# Patient Record
Sex: MALE | Race: WHITE | NOT HISPANIC OR LATINO | Employment: STUDENT | URBAN - METROPOLITAN AREA
[De-identification: names, ages, dates, MRNs, and addresses within clinical notes are randomized per-mention and may not be internally consistent; named-entity substitution may affect disease eponyms.]

---
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
## 2024-09-22 ENCOUNTER — HOSPITAL ENCOUNTER (EMERGENCY)
Age: 19
Discharge: HOME | End: 2024-09-22
Payer: COMMERCIAL

## 2024-09-22 VITALS
OXYGEN SATURATION: 97 % | SYSTOLIC BLOOD PRESSURE: 143 MMHG | HEART RATE: 71 BPM | DIASTOLIC BLOOD PRESSURE: 89 MMHG | RESPIRATION RATE: 17 BRPM | TEMPERATURE: 97.52 F

## 2024-09-22 VITALS
RESPIRATION RATE: 16 BRPM | TEMPERATURE: 98.06 F | DIASTOLIC BLOOD PRESSURE: 76 MMHG | SYSTOLIC BLOOD PRESSURE: 122 MMHG | HEART RATE: 88 BPM | OXYGEN SATURATION: 100 %

## 2024-09-22 DIAGNOSIS — R11.2: Primary | ICD-10-CM

## 2024-09-22 DIAGNOSIS — E86.0: ICD-10-CM

## 2024-09-22 LAB
ADD MANUAL DIFF: NO
ADD URINE MICROSCOPIC?: YES
ALANINE AMINOTRANSFERASE: 37 U/L (ref 6–50)
ALBUMIN LEVEL: 5.4 G/DL (ref 3.5–5.1)
ALBUMIN SPEC-MCNC: 5.4 G/DL (ref 3.5–5.1)
ALKALINE PHOSPHATASE: 44 U/L (ref 38–126)
ALP SERPL-CCNC: 44 U/L (ref 38–126)
ALT SERPL-CCNC: 37 U/L (ref 6–50)
ANION GAP: 15 MMOL/L (ref 4–12)
ASPARTATE AMINO TRANSFERASE: 36 U/L (ref 17–59)
AST SERPL-CCNC: 36 U/L (ref 17–59)
BASIC METABOLIC AND HEMATOCRIT PNL BLD: 42.8 % (ref 42–52)
BILIRUBIN,TOTAL: 0.8 MG/DL (ref 0.2–1.3)
BLOOD UREA NITROGEN: 7 MG/DL (ref 9–20)
BUN SERPL-MCNC: 7 MG/DL (ref 9–20)
CALCIUM: 10.4 MG/DL (ref 8.4–10.2)
CARBON DIOXIDE: 24 MMOL/L (ref 22–30)
CASTS #/AREA URNS HPF: (no result) /[HPF]
CHLORIDE SPEC-MCNC: 102 MMOL/L (ref 98–107)
CHLORIDE: 102 MMOL/L (ref 98–107)
CREAT CL PREDICTED SERPL C-G-VRATE: 153 ML/MIN
ESTIMATED CRCL CALCULATION: 153 ML/MIN
ESTIMATED GLOMERULAR FILT RATE: > 60
GFRSERPLBLD MDRD-ARVRAT: > 60 ML/MIN/{1.73_M2}
GLUCOSE SERPL-MCNC: 136 MG/DL (ref 65–110)
GLUCOSE: 136 MG/DL (ref 65–110)
HEMATOCRIT: 42.8 % (ref 42–52)
HEMOGLOBIN: 14.8 G/DL (ref 14–18)
HGB+HCT PNL BLD: 42.8 % (ref 42–52)
IMM GRANULOCYTES NFR BLD AUTO: 0.3 % (ref 0–0.5)
IPF CHARGE: (no result)
LIPASE SERPL-CCNC: 39 U/L (ref 23–300)
LIPASE: 39 U/L (ref 23–300)
LYMPHOCYTES # SPEC AUTO: 1.1 K/MM3 (ref 0.9–3.2)
MCV RBC: 88.1 FL (ref 80–100)
MEAN CORPUSCULAR HEMOGLOBIN: 30.5 PG (ref 26–34)
MEAN CORPUSCULAR HGB CONC: 34.6 G/DL (ref 32–36)
MICRO URNS: YES
NUCLEATED RED BLOOD CELLS ABSOLUTE AUTO: 0 K/MM3 (ref 0–0.01)
NUCLEATED RED BLOOD CELLS PERC: 0 % (ref 0–0.2)
PLATELET COUNT RESULT: 251 K/MM3 (ref 150–375)
POTASSIUM: 4.2 MMOL/L (ref 3.4–5)
PROT SERPL-MCNC: 9 G/DL (ref 6.3–8.2)
RED BLOOD COUNT: 4.86 M/MM3 (ref 4.6–6.2)
SODIUM: 141 MMOL/L (ref 137–145)
SP GR UR: 1.02 (ref 1–1.03)
TOTAL PROTEIN: 9 G/DL (ref 6.3–8.2)
WHITE BLOOD COUNT: 9.4 K/MM3 (ref 4.5–10)

## 2024-09-22 PROCEDURE — 81001 URINALYSIS AUTO W/SCOPE: CPT

## 2024-09-22 PROCEDURE — 85025 COMPLETE CBC W/AUTO DIFF WBC: CPT

## 2024-09-22 PROCEDURE — 83690 ASSAY OF LIPASE: CPT

## 2024-09-22 PROCEDURE — 80307 DRUG TEST PRSMV CHEM ANLYZR: CPT

## 2024-09-22 PROCEDURE — 99285 EMERGENCY DEPT VISIT HI MDM: CPT

## 2024-09-22 PROCEDURE — 80053 COMPREHEN METABOLIC PANEL: CPT

## 2024-09-22 PROCEDURE — 36415 COLL VENOUS BLD VENIPUNCTURE: CPT

## 2024-09-22 PROCEDURE — 96361 HYDRATE IV INFUSION ADD-ON: CPT

## 2024-09-22 PROCEDURE — 96374 THER/PROPH/DIAG INJ IV PUSH: CPT

## 2024-09-22 NOTE — ED_ITS
HPI - General Adult    
General    
Chief complaint: Nausea/Vomiting/Diarrhea    
Stated complaint: n/v    
Time Seen by Provider: 09/22/24 13:28    
History of Present Illness    
HPI narrative:     
  Patient is a 19-year-old male who presents to the emergency department this   
afternoon complaining of nausea and vomiting and feeling dehydrated.  Patient   
states that he was at a party last night in consumed at least 10 alcoholic   
drinks.  Patient states that shortly after midnight he started to vomit and has   
been vomiting since then.  Denies any abdominal pain, any chest pain or   
shortness of breath, any fevers or chills.  No additional symptoms or concerns   
at this time.    
Related Data    
                                    Allergies    
    
    
    
Allergy/AdvReac Type Severity Reaction Status Date / Time    
     
No Known Allergies Allergy   Verified 09/22/24 13:29    
    
    
    
    
Review of Systems    
    
    
Review of Systems:       
  All systems are reviewed and are negative unless stated otherwise in the HPI.    
       
    
    
Exam    
    
    
Narrative:       
General: Alert, awake, afebrile, in no acute distress.    
HEENT: PERRL, no rhinorrhea, no post nasal drip, oropharynx clear.    
Cardiovascular: Regular rate and rhythm, no murmurs, rubs or gallops, no   
peripheral edema.    
Respiratory: Clear to auscultation bilaterally, no tachypnea, no wheezing, no   
rhonchi, no rubs, no respiratory distress.    
Abdomen: Soft, nontender, nondistended, no rebound, no guarding, no peritoneal   
signs.    
Musculoskeletal: No joint swelling or deformity, normal muscle tone.    
Skin: No rashes or petechia, no signs of infection.    
Neurological: Alert and oriented to person, place, and time.  Follows all   
commands.  No focal deficits, speech is clear and fluent.    
       
    
    
Course    
Vital Signs    
Vital signs:     
    
                                   Vital Signs    
    
    
    
Temperature  97.6 F   09/22/24 12:32    
     
Pulse Rate  71   09/22/24 12:32    
     
Respiratory Rate  17   09/22/24 12:32    
     
Blood Pressure  143/89 H  09/22/24 12:32    
     
Pulse Oximetry  97   09/22/24 12:32    
    
    
                                            
    
    
    
Temperature  98.1 F   09/22/24 14:10    
     
Pulse Rate  88   09/22/24 14:10    
     
Respiratory Rate  16   09/22/24 14:10    
     
Blood Pressure  122/76   09/22/24 14:10    
     
Pulse Oximetry  100   09/22/24 14:10    
    
    
    
    
    
Medical Decision Making    
[facility] Narrative    
Medical decision making narrative:     
 The patient was evaluated by myself in the emergency department. History is   
obtained from patient who is an independent historian and physical exam was   
performed.  External medical records were reviewed at this time.  IV was   
established and pertinent tests were ordered.    
     
Patient was administered a 1 L IV fluid bolus with normal saline and 4 mg of IV   
Zofran for nausea.    
     
Laboratory results obtained revealing  No acute process.  Urinalysis revealed   
trace ketones otherwise unremarkable.    
     
Differential diagnosis considerations include dehydration, electrolyte   
derangements, gastroenteritis.    
     
Comorbidities impacting this visit include none.    
     
I have evaluated and discussed social determinants of health with the patient   
that could potentially impact subsequent diagnosis and treatment plans.    
     
On repeat assessment of the patient, reevaluation revealed that the patient is   
doing well and is in no acute distress.  Patient symptoms have improved since he  
arrived to our emergency department. Repeat vital signs were all reviewed and   
noted to be stable. Differential diagnosis and treatment plan were discussed   
with the patient at bedside. Patient agrees with discussion and after shared

## 2024-09-22 NOTE — ED.GENADULT
HPI - General Adult
General
Chief complaint: Nausea/Vomiting/Diarrhea
Stated complaint: n/v
Time Seen by Provider: 09/22/24 13:28
History of Present Illness
HPI narrative: 
  Patient is a 19-year-old male who presents to the emergency department this afternoon complaining of nausea and vomiting and feeling dehydrated.  Patient states that he was at a party last night in consumed at least 10 alcoholic drinks.  Patient 
states that shortly after midnight he started to vomit and has been vomiting since then.  Denies any abdominal pain, any chest pain or shortness of breath, any fevers or chills.  No additional symptoms or concerns at this time.
Related Data
Allergies

Allergy/AdvReac Type Severity Reaction Status Date / Time
No Known Allergies Allergy   Verified 09/22/24 13:29



Review of Systems
Review of Systems:   
  All systems are reviewed and are negative unless stated otherwise in the HPI.
 

Exam
Narrative:   
General: Alert, awake, afebrile, in no acute distress.
HEENT: PERRL, no rhinorrhea, no post nasal drip, oropharynx clear.
Cardiovascular: Regular rate and rhythm, no murmurs, rubs or gallops, no peripheral edema.
Respiratory: Clear to auscultation bilaterally, no tachypnea, no wheezing, no rhonchi, no rubs, no respiratory distress.
Abdomen: Soft, nontender, nondistended, no rebound, no guarding, no peritoneal signs.
Musculoskeletal: No joint swelling or deformity, normal muscle tone.
Skin: No rashes or petechia, no signs of infection.
Neurological: Alert and oriented to person, place, and time.  Follows all commands.  No focal deficits, speech is clear and fluent.
 

Course
Vital Signs
Vital signs: 

Vital Signs

Temperature  97.6 F   09/22/24 12:32
Pulse Rate  71   09/22/24 12:32
Respiratory Rate  17   09/22/24 12:32
Blood Pressure  143/89 H  09/22/24 12:32
Pulse Oximetry  97   09/22/24 12:32



Temperature  98.1 F   09/22/24 14:10
Pulse Rate  88   09/22/24 14:10
Respiratory Rate  16   09/22/24 14:10
Blood Pressure  122/76   09/22/24 14:10
Pulse Oximetry  100   09/22/24 14:10




Medical Decision Making
[facility] Narrative
Medical decision making narrative: 
 The patient was evaluated by myself in the emergency department. History is obtained from patient who is an independent historian and physical exam was performed.  External medical records were reviewed at this time.  IV was established and 
pertinent tests were ordered.
 
Patient was administered a 1 L IV fluid bolus with normal saline and 4 mg of IV Zofran for nausea.
 
Laboratory results obtained revealing  No acute process.  Urinalysis revealed trace ketones otherwise unremarkable.
 
Differential diagnosis considerations include dehydration, electrolyte derangements, gastroenteritis.
 
Comorbidities impacting this visit include none.
 
I have evaluated and discussed social determinants of health with the patient that could potentially impact subsequent diagnosis and treatment plans.
 
On repeat assessment of the patient, reevaluation revealed that the patient is doing well and is in no acute distress.  Patient symptoms have improved since he arrived to our emergency department. Repeat vital signs were all reviewed and noted to be 
stable. Differential diagnosis and treatment plan were discussed with the patient at bedside. Patient agrees with discussion and after shared medical decision making agrees with discharge. All questions were answered to the patient's satisfaction.

Patient will follow up with his PCP in 3-5 days. Patient was provided with strict return precautions and instructed to return to the emergency department if any new or worsening symptoms develop. The patient was discharged in stable condition.



Vital Signs
Vital Signs: 

Vital Signs

Temperature  97.6 F   09/22/24 12:32
Pulse Rate  71   09/22/24 12:32
Respiratory Rate  17   09/22/24 12:32
Blood Pressure  143/89 H  09/22/24 12:32
Pulse Oximetry  97   09/22/24 12:32



Temperature  98.1 F   09/22/2

## 2024-10-30 ENCOUNTER — TELEPHONE (OUTPATIENT)
Dept: INTERNAL MEDICINE | Age: 19
End: 2024-10-30

## 2024-10-30 NOTE — TELEPHONE ENCOUNTER
Caller: marialuisa faulkner    Relationship: Father    Best call back number: [phone]     What form or medical record are you requesting: IMMUNIZATION RECORDS    Who is requesting this form or medical record from you: PATIENT    How would you like to receive the form or medical records (pick-up, mail, fax): DOWNLOAD ONTO [employer]      Timeframe paperwork needed: ASAP    Additional notes: